# Patient Record
Sex: FEMALE | URBAN - METROPOLITAN AREA
[De-identification: names, ages, dates, MRNs, and addresses within clinical notes are randomized per-mention and may not be internally consistent; named-entity substitution may affect disease eponyms.]

---

## 2020-11-09 ENCOUNTER — CONVERSION ENCOUNTER (OUTPATIENT)
Dept: OTHER | Facility: HOSPITAL | Age: 85
End: 2020-11-09

## 2020-11-09 ENCOUNTER — OFFICE VISIT CONVERTED (OUTPATIENT)
Dept: CARDIOLOGY | Facility: CLINIC | Age: 85
End: 2020-11-09
Attending: SPECIALIST

## 2020-12-03 ENCOUNTER — OFFICE VISIT CONVERTED (OUTPATIENT)
Dept: CARDIOLOGY | Facility: CLINIC | Age: 85
End: 2020-12-03
Attending: SPECIALIST

## 2021-05-10 NOTE — H&P
History and Physical      Patient Name: Terri Tee   Patient ID: [id number]   Sex: Female   YOB: 1935    Primary Care Provider: Sharif STANTON   Referring Provider: Sharif STANTON    Visit Date: November 9, 2020    Provider: Curry Solis MD   Location: [facility]   Location Address: [address] [place] [address]  [id number]   Location Phone: [phone]          Chief Complaint  · Tachycardia  · Shortness of breath       History Of Present Illness  Consult requested by: Justina STANTON   Terri Tee is a 85 year old female noticed to have tachycardia on routine examination. Denies any palpitations or chest pain. She has shortness of breath on exertion. No PND or orthopnea.   PAST MEDICAL HISTORY: Positive for tachycardia, shortness of breath and cancer.   FAMILY HISTORY: Negative for diabetes, hypertension and heart disease.   PSYCHOSOCIAL HISTORY: Positive for depression and mood changes. Patient is . Never drinks alcohol and previously quit smoking.   CURRENT MEDICATIONS: include .Metoprolol 25 mg qd; Venlafaxine 75 mg qd. The dosage and frequency of the medications were reviewed with the patient.   ALLERGIES: No known drug allergies.   CHOLESTEROL STATUS: high as per the patient.       Review of Systems  · Constitutional  o Admits  o : not to be in good general health lately; recent weight changes and fatigue  · Eyes  o Admits  o : blurred or double vision  · HENT  o Admits  o : hearing loss or ringing; swollen glands in neck  o Denies  o : chronic sinus problem  · Cardiovascular  o Admits  o : swelling feet, ankles,hands; shortness of breath   o Denies  o : chest pain, palpitations (fast, fluttering, or skipping beats)  · Respiratory  o Admits  o : COPD  o Denies  o : asthma or wheezing  · Gastrointestinal  o Denies  o : ulcers, nausea or vomiting  · Neurologic  o Admits  o : lightheaded or dizzy; headaches  o Denies  o :  "stroke  · Musculoskeletal  o Denies  o : joint pain, back pain  · Endocrine  o Admits  o : heat or cold intolerance  o Denies  o : thyroid disease, diabetes, excessive thirst or urination  · Heme-Lymph  o Denies  o : bleeding or bruising tendency, anemia      Vitals  Date Time BP Position Site L\R Cuff Size HR RR TEMP (F) WT  HT  BMI kg/m2 BSA m2 O2 Sat FR L/min FiO2 HC       11/09/2020 01:06 /63 Sitting    98 - R   100lbs 2oz 5'  6\" 16.16 1.45             Physical Examination  · Constitutional  o Appearance  o : Awake, alert, cooperative, pleasant.  · Eyes  o Pupils and Irises  o : Pupils equal and reacting to light and accommodation.  · Ears, Nose, Mouth and Throat  o Ears  o : Tympanic membranes are normal.  o Nose  o : Clear with no maxillary tenderness.  o Oral Cavity  o : Clear.  · Neck  o Inspection/Palpation  o : No JVD, bruits, thyromegaly, or adenopathy. Trachea midline. No axillary or cervical lymphadenopathy.  o Thyroid  o : No thyroid enlargement.   · Respiratory  o Inspection of Chest  o : No chest wall deformities, moving equal.  o Auscultation of Lungs  o : Good air entry with vesicular breath sounds.  o Percussion of Chest  o : Resonant bilaterally.   o Palpation of Chest  o : Equal movements bilaterally.  · Cardiovascular  o Heart  o :   § Auscultation of Heart  § : PMI is in the 5th intercostal space. S1 and S2 regular. No S3. No S4. No murmurs.  o Peripheral Vascular System  o :   § Extremities  § : No pedal edema. Peripheral pulses well felt. No cyanosis.  · Gastrointestinal  o Abdominal Examination  o : No organomegaly, masses, tenderness, bruits, or abnormal pulsations. Bowel sounds are normal.  · Musculoskeletal  o General  o : Muscle strength is normal with normal tone.  · Skin and Subcutaneous Tissue  o General Inspection  o : No rash, petechiae, or suspicious skin lesions. Skin turgor is normal.  · EKG  o EKG  o : was done today  o Indications  o : tachycardia  o Results  o : shows " sinus rhythm          Assessment     IMPRESSION/PLAN    1. Palpitations. Tachycardia. Increase Metoprolol  ER to 25 mg bid. Will do a 24-hour Holter to rule out any significant arrhythmias. Will do an echocardiogram to evaluate left ventricle systolic function.              Curry Solis MD  LIOR/wt             Electronically Signed by: Francie Roa-, -Author on November 16, 2020 07:36:35 AM  Electronically Co-signed by: Curry Solis MD -Reviewer on November 18, 2020 09:18:48 AM

## 2021-05-10 NOTE — PROCEDURES
Procedure Note      Patient Name: Terri Tee   Patient ID: [id number]   Sex: Female   YOB: 1935    Primary Care Provider: Sharif STANTON   Referring Provider: Sharif STANTON    Visit Date: December 3, 2020    Provider: Curry Solis MD   [facility] [facility]   Location Address: [address] [place] [address]  [id number]   Location Phone: [phone]          FINAL REPORT   HOLTER MONITOR REPORT  Date: 12/03/2020   Indication: Palpitations, Shortness of Breath, and Tachycardia      The patient was monitored for a period of 24-hours. The total number of beats was 036529 with an average rate of 96. The maximum beats per minute was 134 with a minimum beats per minute of 81.      There were short runs of supraventricular tachycardia and occasional PVC's.       CONCLUSION: 24-hour Holter reveals short runs of nonsustained supraventricular tachycardia and occasional PVC's.      Curry Solis MD  LIOR/wt                 Electronically Signed by: Francie Roa-, -Author on December 16, 2020 02:07:16 PM  Electronically Co-signed by: Curry Solis MD -Reviewer on December 18, 2020 12:19:38 PM

## 2021-05-10 NOTE — PROCEDURES
Procedure Note      Patient Name: Terri Tee   Patient ID: [id number]   Sex: Female   YOB: 1935    Primary Care Provider: Sharif STANTON   Referring Provider: Sharif STANTON    Visit Date: December 3, 2020    Provider: Curry Solis MD   [facility]   Location Address: [address] [place] [address]  [id number]   Location Phone: [phone]          FINAL REPORT   TRANSTHORACIC ECHOCARDIOGRAM REPORT    Diagnosis: Shortness of breath and Tachycardia   Height: HEIGHT Weight: WEIGHT B/P: BLOOD PRESSURE BSA: BSA   Tech: JLW   MEASUREMENTS:  RVID (Diastole) : RVID. (NORMAL: 0.7 to 2.4 cm max)   LVID (Systole): 2.1 cm (Diastole): 3.3 cm . (NORMAL: 3.7 - 5.4 cm)   Posterior Wall Thickness (Diastole): 1 cm. (NORMAL: 0.8 - 1.1 cm)   Septal Thickness (Diastole): 0.9 cm. (NORMAL: 0.7 - 1.2 cm)   LAID (Systole): 2.6 cm. (NORMAL: 1.9 - 3.8 cm)   Aortic Root Diameter (Diastole): 3.6 cm. (NORMAL: 2.0 - 3.7 cm)   DOPPLER:  E/A ratio 0.8 (NORMAL 0.8-2.0)   DT: 193 msec (NORMAL 140-240 msec.)   IVRT 90 m/sec (NORMAL  m/sec.)   E/E': 9 (NORMAL <8 avg.)   COMMENTS:  The patient underwent 2-D, M-Mode, and Doppler examination, including pulse-wave, continuous-wave, and color-flow analysis; the study is technically adequate.   FINDINGS:  AORTIC VALVE: fibrotic.   MITRAL VALVE: fibrotic.   TRICUSPID VALVE: Normal.   PULMONIC VALVE: Not well visualized.   LEFT ATRIUM: Normal. No intracavitary masses or clots seen. LA volume index is 19 mL/m2.   AORTIC ROOT: Normal in size with adequate motion.   LEFT VENTRICLE: Normal left ventricular systolic function. Ejection fraction 61%.   RIGHT ATRIUM: Normal.   RIGHT VENTRICLE: Normal size and function.   PERICARDIUM: Unremarkable. No evidence of effusion.   INFERIOR VENA CAVA: Diameter is 1.8 cm.   DOPPLER: Doppler examination of the aortic, mitral, tricuspid, and pulmonary valves was performed. Normal pulmonary  artery systolic pressure by Doppler. Shows mild aortic regurgitation, trace mitral regurgitation and trace tricuspid regurgitation.      CONCLUSION    1. Fibrocalcific mitral and aortic valves.  2. Normal left ventricle systolic function.  3. Mild aortic regurgitation.  4. Trace mitral regurgitation.  5. Trace tricuspid regurgitation.      Curry Solis MD  LIOR/wt                 Electronically Signed by: Francie Roa-, -Author on December 3, 2020 02:15:50 PM  Electronically Co-signed by: Curry Solis MD -Reviewer on December 7, 2020 09:16:57 AM

## 2021-05-14 VITALS
BODY MASS INDEX: 16.09 KG/M2 | SYSTOLIC BLOOD PRESSURE: 124 MMHG | DIASTOLIC BLOOD PRESSURE: 63 MMHG | HEIGHT: 66 IN | HEART RATE: 98 BPM | WEIGHT: 100.12 LBS